# Patient Record
Sex: FEMALE | Race: WHITE | NOT HISPANIC OR LATINO | Employment: FULL TIME | URBAN - METROPOLITAN AREA
[De-identification: names, ages, dates, MRNs, and addresses within clinical notes are randomized per-mention and may not be internally consistent; named-entity substitution may affect disease eponyms.]

---

## 2017-01-13 ENCOUNTER — OFFICE VISIT (OUTPATIENT)
Dept: INTERNAL MEDICINE | Facility: CLINIC | Age: 37
End: 2017-01-13

## 2017-01-13 VITALS
HEART RATE: 98 BPM | DIASTOLIC BLOOD PRESSURE: 64 MMHG | SYSTOLIC BLOOD PRESSURE: 122 MMHG | WEIGHT: 236.13 LBS | TEMPERATURE: 98.5 F | RESPIRATION RATE: 18 BRPM

## 2017-01-13 DIAGNOSIS — M54.50 CHRONIC LOW BACK PAIN WITHOUT SCIATICA, UNSPECIFIED BACK PAIN LATERALITY: ICD-10-CM

## 2017-01-13 DIAGNOSIS — Z00.00 WELL ADULT EXAM: Primary | ICD-10-CM

## 2017-01-13 DIAGNOSIS — Z13.220 LIPID SCREENING: ICD-10-CM

## 2017-01-13 DIAGNOSIS — E66.9 OBESITY, UNSPECIFIED OBESITY SEVERITY, UNSPECIFIED OBESITY TYPE: ICD-10-CM

## 2017-01-13 DIAGNOSIS — G89.29 CHRONIC LOW BACK PAIN WITHOUT SCIATICA, UNSPECIFIED BACK PAIN LATERALITY: ICD-10-CM

## 2017-01-13 DIAGNOSIS — R53.83 FATIGUE, UNSPECIFIED TYPE: ICD-10-CM

## 2017-01-13 LAB
ALBUMIN SERPL-MCNC: 4.3 G/DL (ref 3.2–4.8)
ALBUMIN/GLOB SERPL: 1.4 G/DL (ref 1.5–2.5)
ALP SERPL-CCNC: 78 U/L (ref 25–100)
ALT SERPL W P-5'-P-CCNC: 28 U/L (ref 7–40)
ANION GAP SERPL CALCULATED.3IONS-SCNC: 3 MMOL/L (ref 3–11)
AST SERPL-CCNC: 18 U/L (ref 0–33)
BILIRUB SERPL-MCNC: 0.6 MG/DL (ref 0.3–1.2)
BUN BLD-MCNC: 11 MG/DL (ref 9–23)
BUN/CREAT SERPL: 15.7 (ref 7–25)
CALCIUM SPEC-SCNC: 9.8 MG/DL (ref 8.7–10.4)
CHLORIDE SERPL-SCNC: 103 MMOL/L (ref 99–109)
CHOLEST SERPL-MCNC: 187 MG/DL
CO2 SERPL-SCNC: 31 MMOL/L (ref 20–31)
CREAT BLD-MCNC: 0.7 MG/DL (ref 0.6–1.3)
DEPRECATED RDW RBC AUTO: 43.2 FL (ref 37–54)
ERYTHROCYTE [DISTWIDTH] IN BLOOD BY AUTOMATED COUNT: 12.7 % (ref 11.3–14.5)
EXPIRATION DATE: NORMAL
GFR SERPL CREATININE-BSD FRML MDRD: 95 ML/MIN/1.73
GLOBULIN UR ELPH-MCNC: 3.1 GM/DL
GLUCOSE BLD-MCNC: 94 MG/DL (ref 70–100)
HCT VFR BLD AUTO: 41 % (ref 34.5–44)
HDLC SERPL-MCNC: 62 MG/DL
HGB BLD-MCNC: 13.8 G/DL (ref 11.5–15.5)
LDLC SERPL CALC-MCNC: 110 MG/DL
LDLC/HDLC SERPL: 1.8 {RATIO}
Lab: NORMAL
MCH RBC QN AUTO: 31.2 PG (ref 27–31)
MCHC RBC AUTO-ENTMCNC: 33.7 G/DL (ref 32–36)
MCV RBC AUTO: 92.6 FL (ref 80–99)
PLATELET # BLD AUTO: 290 10*3/MM3 (ref 150–450)
PMV BLD AUTO: 11.4 FL (ref 6–12)
POTASSIUM BLD-SCNC: 4.2 MMOL/L (ref 3.5–5.5)
PROT SERPL-MCNC: 7.4 G/DL (ref 5.7–8.2)
RBC # BLD AUTO: 4.43 10*6/MM3 (ref 3.89–5.14)
SODIUM BLD-SCNC: 137 MMOL/L (ref 132–146)
T4 FREE SERPL-MCNC: 0.96 NG/DL (ref 0.89–1.76)
TRIGL SERPL-MCNC: 74 MG/DL
TSH SERPL DL<=0.05 MIU/L-ACNC: 0.98 MIU/ML (ref 0.35–5.35)
WBC NRBC COR # BLD: 8.58 10*3/MM3 (ref 3.5–10.8)

## 2017-01-13 PROCEDURE — 80061 LIPID PANEL: CPT | Performed by: INTERNAL MEDICINE

## 2017-01-13 PROCEDURE — 85027 COMPLETE CBC AUTOMATED: CPT | Performed by: INTERNAL MEDICINE

## 2017-01-13 PROCEDURE — 84443 ASSAY THYROID STIM HORMONE: CPT | Performed by: INTERNAL MEDICINE

## 2017-01-13 PROCEDURE — 99385 PREV VISIT NEW AGE 18-39: CPT | Performed by: INTERNAL MEDICINE

## 2017-01-13 PROCEDURE — 84439 ASSAY OF FREE THYROXINE: CPT | Performed by: INTERNAL MEDICINE

## 2017-01-13 PROCEDURE — 80053 COMPREHEN METABOLIC PANEL: CPT | Performed by: INTERNAL MEDICINE

## 2017-01-13 RX ORDER — MULTIVITAMIN WITH IRON
TABLET ORAL 4 TIMES DAILY
COMMUNITY

## 2017-01-13 NOTE — PROGRESS NOTES
Subjective   Caterina Baldwin is a 36 y.o. female.     History of Present Illness     Complete physical     1 thyroid disease- Patient is concerned about the possibility of thyroid disease. She has been experiencing hair loss, fullness in the throat, weight gain and difficulty with lossing weight.  Patient says that she has been struggling with this for the past 3-4 months and thinks that it may be hypothyroid.    2 Obesity-chronic.  Patient says that she's been doing with weight for several years and has tried multiple different fad diets and neither of these have worked.      3 Low back pain-chronic patient says that the pain is localized to lower lumbar region and is worse with flexion, extension, rotational movement.  Denies any previous trauma to this area pain has been controlled on over-the-counter such as Motrin or Tylenol.    Nutrition high sugars, fast food, she tends to eat more comfort foods    Exercise: Minimal    screening  Pap smear last year normal- done by Gyn    Menses History  Started 10 years of age  Regular/normal  Flow 5-7 days  +last 1 year her menstral cycle has changed and become               Review of Systems   Constitutional: Positive for fatigue. Negative for chills, diaphoresis, fever and unexpected weight change.   Gastrointestinal: Negative for constipation, diarrhea, nausea, rectal pain and vomiting.   Psychiatric/Behavioral: Negative for decreased concentration, dysphoric mood, hallucinations, self-injury, sleep disturbance and suicidal ideas.       Objective   Physical Exam   HENT:   Head: Normocephalic.   Right Ear: External ear normal.   Left Ear: External ear normal.   Nose: Nose normal.   Mouth/Throat: Oropharynx is clear and moist.   Eyes: Conjunctivae are normal. Pupils are equal, round, and reactive to light.   Neck: Normal range of motion.   Cardiovascular: Normal rate, regular rhythm and normal heart sounds.    Pulmonary/Chest: Effort normal and breath sounds normal.    Abdominal: Soft.   Musculoskeletal: Normal range of motion.   Neurological: She is alert. She has normal reflexes.   Skin: Skin is warm.   Psychiatric: She has a normal mood and affect. Her behavior is normal. Thought content normal.       Assessment/Plan   Caterina was seen today for establish care and annual exam.    Diagnoses and all orders for this visit:    Well adult exam    Fatigue, unspecified type  -     CBC (No Diff)  -     Comprehensive Metabolic Panel  -     T4, Free  -     TSH    Obesity, unspecified obesity severity, unspecified obesity type  -     CBC (No Diff)  -     Comprehensive Metabolic Panel  -     T4, Free  -     TSH    Chronic low back pain without sciatica, unspecified back pain laterality    Lipid screening  -     POC Lipid Panel    Anticipatory guidance:  Discussed the importance of diet modification.  Focus on a variety of different exercises.

## 2017-01-13 NOTE — MR AVS SNAPSHOT
Caterina Baldwin   2017 9:00 AM   Office Visit    Provider:  Joaquín Kay MD   Department:  [facility] MEDICINE AND [facility]ED[facility]ATRICS   Dept Phone:  [phone]                Your Full Care Plan              Your Updated Medication List          This list is accurate as of: 17 10:23 AM.  Always use your most recent med list.                Magnesium 250 MG tablet       MULTI VITAMIN DAILY PO               We Performed the Following     CBC (No Diff)     Comprehensive Metabolic Panel     POC Lipid Panel     T4, Free     TSH       You Were Diagnosed With        Codes Comments    Well adult exam    -  Primary ICD-10-CM: Z00.00  ICD-9-CM: V70.0     Fatigue, unspecified type     ICD-10-CM: R53.83  ICD-9-CM: 780.79     Obesity, unspecified obesity severity, unspecified obesity type     ICD-10-CM: E66.9  ICD-9-CM: 278.00     Chronic low back pain without sciatica, unspecified back pain laterality     ICD-10-CM: M54.5, G89.29  ICD-9-CM: 724.2, 338.29     Lipid screening     ICD-10-CM: Z13.220  ICD-9-CM: V77.91       Instructions     None    Patient Instructions History      [employer] Signup     [facility] [employer] allows you to send messages to your doctor, view your test results, renew your prescriptions, schedule appointments, and more. To sign up, go to [employer] and click on the Sign Up Now link in the New User? box. Enter your [employer] Activation Code exactly as it appears below along with the last four digits of your Social Security Number and your Date of Birth () to complete the sign-up process. If you do not sign up before the expiration date, you must request a new code.    [employer] Activation Code: [email or web address]  Expires: 2017 10:22 AM    If you have questions, you can email [email or web address] or call [phone] to talk to our [employer] staff. Remember, [employer] is NOT to be used for urgent needs. For medical emergencies,  dial 911.               Other Info from Your Visit           Allergies     No Known Allergies      Reason for Visit     Establish Care     Annual Exam           Vital Signs     Blood Pressure Pulse Temperature Respirations Weight Smoking Status    122/64 (BP Location: Right arm, Patient Position: Sitting) 98 98.5 °F (36.9 °C) (Temporal Artery ) 18 236 lb 2 oz (107 kg) Never Smoker      Problems and Diagnoses Noted     Well adult exam    -  Primary    Tiredness        Obesity        Chronic low back pain without sciatica, unspecified back pain laterality        Screening for cholesterol level          Results

## 2017-01-17 ENCOUNTER — TELEPHONE (OUTPATIENT)
Dept: INTERNAL MEDICINE | Facility: CLINIC | Age: 37
End: 2017-01-17

## 2017-01-17 DIAGNOSIS — L65.9 ALOPECIA: Primary | ICD-10-CM

## 2017-01-17 DIAGNOSIS — F32.A DEPRESSION, UNSPECIFIED DEPRESSION TYPE: Primary | ICD-10-CM

## 2017-01-17 NOTE — TELEPHONE ENCOUNTER
----- Message from Chitra Lawler sent at 1/17/2017  9:31 AM EST -----  Contact: Patient  Patient would like results of recent blood work.  She can be reached at [phone].

## 2017-01-20 RX ORDER — DULOXETIN HYDROCHLORIDE 30 MG/1
30 CAPSULE, DELAYED RELEASE ORAL DAILY
Qty: 30 CAPSULE | Refills: 4 | Status: SHIPPED | OUTPATIENT
Start: 2017-01-20 | End: 2017-02-22

## 2017-02-13 ENCOUNTER — TELEPHONE (OUTPATIENT)
Dept: INTERNAL MEDICINE | Facility: CLINIC | Age: 37
End: 2017-02-13

## 2017-02-13 NOTE — TELEPHONE ENCOUNTER
----- Message from Kenya Pillai sent at 2/13/2017  8:31 AM EST -----  Concerning pts dermatology referral, I have been unable to contact pt concerning this referral. She has not responded to phone calls or letters. Is this okay to cancel?

## 2017-02-22 ENCOUNTER — HOSPITAL ENCOUNTER (EMERGENCY)
Facility: HOSPITAL | Age: 37
Discharge: HOME | End: 2017-02-22
Attending: EMERGENCY MEDICINE | Admitting: EMERGENCY MEDICINE

## 2017-02-22 ENCOUNTER — APPOINTMENT (OUTPATIENT)
Dept: GENERAL RADIOLOGY | Facility: HOSPITAL | Age: 37
End: 2017-02-22

## 2017-02-22 ENCOUNTER — OFFICE VISIT (OUTPATIENT)
Dept: INTERNAL MEDICINE | Facility: CLINIC | Age: 37
End: 2017-02-22

## 2017-02-22 VITALS
HEIGHT: 66 IN | TEMPERATURE: 98 F | DIASTOLIC BLOOD PRESSURE: 74 MMHG | SYSTOLIC BLOOD PRESSURE: 109 MMHG | WEIGHT: 230 LBS | OXYGEN SATURATION: 100 % | RESPIRATION RATE: 12 BRPM | BODY MASS INDEX: 36.96 KG/M2 | HEART RATE: 73 BPM

## 2017-02-22 VITALS
TEMPERATURE: 98.4 F | RESPIRATION RATE: 18 BRPM | WEIGHT: 233 LBS | SYSTOLIC BLOOD PRESSURE: 118 MMHG | HEART RATE: 80 BPM | DIASTOLIC BLOOD PRESSURE: 64 MMHG

## 2017-02-22 DIAGNOSIS — R07.9 CHEST PAIN, UNSPECIFIED TYPE: Primary | ICD-10-CM

## 2017-02-22 DIAGNOSIS — R53.83 FATIGUE, UNSPECIFIED TYPE: ICD-10-CM

## 2017-02-22 DIAGNOSIS — R07.89 OTHER CHEST PAIN: Primary | ICD-10-CM

## 2017-02-22 DIAGNOSIS — R10.13 DYSPEPSIA: ICD-10-CM

## 2017-02-22 LAB
ALBUMIN SERPL-MCNC: 4.3 G/DL (ref 3.2–4.8)
ALBUMIN/GLOB SERPL: 1.3 G/DL (ref 1.5–2.5)
ALP SERPL-CCNC: 64 U/L (ref 25–100)
ALT SERPL W P-5'-P-CCNC: 28 U/L (ref 7–40)
ANION GAP SERPL CALCULATED.3IONS-SCNC: 4 MMOL/L (ref 3–11)
AST SERPL-CCNC: 25 U/L (ref 0–33)
B-HCG UR QL: NEGATIVE
BASOPHILS # BLD AUTO: 0.03 10*3/MM3 (ref 0–0.2)
BASOPHILS NFR BLD AUTO: 0.4 % (ref 0–1)
BILIRUB SERPL-MCNC: 0.5 MG/DL (ref 0.3–1.2)
BNP SERPL-MCNC: 5 PG/ML (ref 0–100)
BUN BLD-MCNC: 16 MG/DL (ref 9–23)
BUN/CREAT SERPL: 22.9 (ref 7–25)
CALCIUM SPEC-SCNC: 9.5 MG/DL (ref 8.7–10.4)
CHLORIDE SERPL-SCNC: 106 MMOL/L (ref 99–109)
CO2 SERPL-SCNC: 28 MMOL/L (ref 20–31)
CREAT BLD-MCNC: 0.7 MG/DL (ref 0.6–1.3)
D DIMER PPP FEU-MCNC: 0.27 MG/L (FEU) (ref 0–0.5)
DEPRECATED RDW RBC AUTO: 42.7 FL (ref 37–54)
EOSINOPHIL # BLD AUTO: 0.21 10*3/MM3 (ref 0.1–0.3)
EOSINOPHIL NFR BLD AUTO: 3.1 % (ref 0–3)
ERYTHROCYTE [DISTWIDTH] IN BLOOD BY AUTOMATED COUNT: 12.6 % (ref 11.3–14.5)
GFR SERPL CREATININE-BSD FRML MDRD: 95 ML/MIN/1.73
GLOBULIN UR ELPH-MCNC: 3.2 GM/DL
GLUCOSE BLD-MCNC: 88 MG/DL (ref 70–100)
HCT VFR BLD AUTO: 41.3 % (ref 34.5–44)
HGB BLD-MCNC: 14 G/DL (ref 11.5–15.5)
HOLD SPECIMEN: NORMAL
HOLD SPECIMEN: NORMAL
IMM GRANULOCYTES # BLD: 0.01 10*3/MM3 (ref 0–0.03)
IMM GRANULOCYTES NFR BLD: 0.1 % (ref 0–0.6)
INTERNAL NEGATIVE CONTROL: NEGATIVE
INTERNAL POSITIVE CONTROL: POSITIVE
LIPASE SERPL-CCNC: 30 U/L (ref 6–51)
LYMPHOCYTES # BLD AUTO: 2.24 10*3/MM3 (ref 0.6–4.8)
LYMPHOCYTES NFR BLD AUTO: 32.8 % (ref 24–44)
Lab: NORMAL
MCH RBC QN AUTO: 31.3 PG (ref 27–31)
MCHC RBC AUTO-ENTMCNC: 33.9 G/DL (ref 32–36)
MCV RBC AUTO: 92.2 FL (ref 80–99)
MONOCYTES # BLD AUTO: 0.42 10*3/MM3 (ref 0–1)
MONOCYTES NFR BLD AUTO: 6.2 % (ref 0–12)
NEUTROPHILS # BLD AUTO: 3.91 10*3/MM3 (ref 1.5–8.3)
NEUTROPHILS NFR BLD AUTO: 57.4 % (ref 41–71)
PLATELET # BLD AUTO: 252 10*3/MM3 (ref 150–450)
PMV BLD AUTO: 10.6 FL (ref 6–12)
POTASSIUM BLD-SCNC: 3.9 MMOL/L (ref 3.5–5.5)
PROT SERPL-MCNC: 7.5 G/DL (ref 5.7–8.2)
RBC # BLD AUTO: 4.48 10*6/MM3 (ref 3.89–5.14)
SODIUM BLD-SCNC: 138 MMOL/L (ref 132–146)
TROPONIN I SERPL-MCNC: <0.006 NG/ML
WBC NRBC COR # BLD: 6.82 10*3/MM3 (ref 3.5–10.8)
WHOLE BLOOD HOLD SPECIMEN: NORMAL
WHOLE BLOOD HOLD SPECIMEN: NORMAL

## 2017-02-22 PROCEDURE — 99284 EMERGENCY DEPT VISIT MOD MDM: CPT

## 2017-02-22 PROCEDURE — 83690 ASSAY OF LIPASE: CPT | Performed by: EMERGENCY MEDICINE

## 2017-02-22 PROCEDURE — 85379 FIBRIN DEGRADATION QUANT: CPT | Performed by: EMERGENCY MEDICINE

## 2017-02-22 PROCEDURE — 93000 ELECTROCARDIOGRAM COMPLETE: CPT | Performed by: INTERNAL MEDICINE

## 2017-02-22 PROCEDURE — 99215 OFFICE O/P EST HI 40 MIN: CPT | Performed by: INTERNAL MEDICINE

## 2017-02-22 PROCEDURE — 85025 COMPLETE CBC W/AUTO DIFF WBC: CPT | Performed by: EMERGENCY MEDICINE

## 2017-02-22 PROCEDURE — 36415 COLL VENOUS BLD VENIPUNCTURE: CPT

## 2017-02-22 PROCEDURE — 80053 COMPREHEN METABOLIC PANEL: CPT | Performed by: EMERGENCY MEDICINE

## 2017-02-22 PROCEDURE — 71010 HC CHEST PA OR AP: CPT

## 2017-02-22 PROCEDURE — 83880 ASSAY OF NATRIURETIC PEPTIDE: CPT | Performed by: EMERGENCY MEDICINE

## 2017-02-22 PROCEDURE — 84484 ASSAY OF TROPONIN QUANT: CPT | Performed by: EMERGENCY MEDICINE

## 2017-02-22 PROCEDURE — 93005 ELECTROCARDIOGRAM TRACING: CPT

## 2017-02-22 RX ORDER — HEPARIN SODIUM 1000 [USP'U]/ML
30 INJECTION, SOLUTION INTRAVENOUS; SUBCUTANEOUS AS NEEDED
Status: DISCONTINUED | OUTPATIENT
Start: 2017-02-22 | End: 2017-02-22

## 2017-02-22 RX ORDER — MAGNESIUM HYDROXIDE/ALUMINUM HYDROXICE/SIMETHICONE 120; 1200; 1200 MG/30ML; MG/30ML; MG/30ML
30 SUSPENSION ORAL ONCE
Status: COMPLETED | OUTPATIENT
Start: 2017-02-22 | End: 2017-02-22

## 2017-02-22 RX ORDER — ACETAMINOPHEN 500 MG
500 TABLET ORAL EVERY 6 HOURS PRN
COMMUNITY

## 2017-02-22 RX ORDER — HEPARIN SODIUM 1000 [USP'U]/ML
38.5 INJECTION, SOLUTION INTRAVENOUS; SUBCUTANEOUS ONCE
Status: DISCONTINUED | OUTPATIENT
Start: 2017-02-22 | End: 2017-02-22

## 2017-02-22 RX ORDER — ACETAMINOPHEN 500 MG
1000 TABLET ORAL ONCE
Status: DISCONTINUED | OUTPATIENT
Start: 2017-02-22 | End: 2017-02-22

## 2017-02-22 RX ORDER — SODIUM CHLORIDE 0.9 % (FLUSH) 0.9 %
10 SYRINGE (ML) INJECTION AS NEEDED
Status: DISCONTINUED | OUTPATIENT
Start: 2017-02-22 | End: 2017-02-22 | Stop reason: HOSPADM

## 2017-02-22 RX ORDER — MELOXICAM 7.5 MG/1
7.5 TABLET ORAL DAILY
COMMUNITY

## 2017-02-22 RX ORDER — ASPIRIN 81 MG/1
324 TABLET, CHEWABLE ORAL ONCE
Status: COMPLETED | OUTPATIENT
Start: 2017-02-22 | End: 2017-02-22

## 2017-02-22 RX ORDER — HEPARIN SODIUM 1000 [USP'U]/ML
60 INJECTION, SOLUTION INTRAVENOUS; SUBCUTANEOUS AS NEEDED
Status: DISCONTINUED | OUTPATIENT
Start: 2017-02-22 | End: 2017-02-22

## 2017-02-22 RX ADMIN — ASPIRIN 81 MG 324 MG: 81 TABLET ORAL at 10:51

## 2017-02-22 RX ADMIN — LIDOCAINE HYDROCHLORIDE 15 ML: 20 SOLUTION ORAL; TOPICAL at 11:29

## 2017-02-22 RX ADMIN — ALUMINUM HYDROXIDE, MAGNESIUM HYDROXIDE, AND SIMETHICONE 30 ML: 200; 200; 20 SUSPENSION ORAL at 11:29

## 2017-02-22 NOTE — PROGRESS NOTES
"Subjective   Caterina Baldwin is a 36 y.o. female.     History of Present Illness     Chest discomfort  Occurred 3 days ago  Patient says that she was at home playing and dancing with daughter when she experienced a sudden onset of midsternal chest discomfort that felt like a pressure or \"squeezing sensation\", and says that the pressure sensation has radiated to the left shoulder.  Patient denies any diaphoresis, nausea, vomiting, + fatigue, + mild dizziness, no syncope, no other systemic signs.    On today's exam, patient says that she still has a mild squeezing or pressure-like sensation midsternally but it has improved since 2 days ago.  She says that she did take some aspirin and this did help relieve some of her symptoms.    family history  CAD- grandmother  HTN      Social History: No EtOH consumption, no cigarettes smoking, no IV drug use, no marijuana.    Past medical history:  Arthritis      Review of Systems   Constitutional: Positive for fatigue. Negative for chills, diaphoresis and fever.   Respiratory: Positive for chest tightness. Negative for apnea, cough, shortness of breath and stridor.    Gastrointestinal: Negative for diarrhea, nausea and vomiting.   Neurological: Positive for dizziness and light-headedness. Negative for weakness, numbness and headaches.   Psychiatric/Behavioral: Negative for agitation, confusion, dysphoric mood, sleep disturbance and suicidal ideas.       Objective   Physical Exam   Constitutional: She is oriented to person, place, and time. She appears well-developed and well-nourished.   HENT:   Head: Normocephalic.   Right Ear: External ear normal.   Left Ear: External ear normal.   Nose: Nose normal.   Mouth/Throat: Oropharynx is clear and moist.   Eyes: Conjunctivae and EOM are normal. Pupils are equal, round, and reactive to light.   Neck: Normal range of motion. Neck supple.   Cardiovascular: Normal rate, regular rhythm and normal heart sounds.    Pulmonary/Chest: Effort " normal and breath sounds normal.   Abdominal: Soft. Bowel sounds are normal.   Neurological: She is alert and oriented to person, place, and time. She has normal reflexes.   Skin: Skin is warm.   Psychiatric: She has a normal mood and affect. Her behavior is normal. Thought content normal.   Nursing note and vitals reviewed.      Assessment/Plan   Caterina was seen today for chest pain.    Diagnoses and all orders for this visit:    Chest pain, unspecified type    Fatigue, unspecified type    Other orders  -     ECG 12 Lead          ECG 12 Lead  Date/Time: 2/22/2017 9:19 AM  Performed by: JOVANI SALDANA  Authorized by: JOVANI SALDANA   Comparison: not compared with previous ECG   Rhythm: sinus rhythm  Rate: normal  Conduction: conduction normal  QRS axis: normal  Clinical impression: normal ECG  Comments: Normal EKG no evidence of ischemia or infarction.          After review of patient's 12-lead EKG, physical exam, risk factors and family history, I am recommending that patient be seen immediately at the emergency department at [facility] for further cardiac workup.  I have discussed this with patient and she is agreement with this transfer.

## 2017-02-22 NOTE — ED PROVIDER NOTES
Subjective   HPI Comments: Caterina Baldwin is a 36 y.o.female who presents to the ED with c/o chest pain. 2 nights ago, she was playing with her daughter when she had sudden onset of pain in her substernal chest described as squeezing and pressure radiating to the back. Since then, the pain has become constant, although it has improved with baby ASA. She denies any similar episodes in the past. Today, she saw her PCP who sent her here for further evaluation after present Cho's sign during examination. She denies any F/C, N/V/D, cough, congestion, or any other acute sx at this time.    Patient is a 36 y.o. female presenting with chest pain.   History provided by:  Patient  Chest Pain   Pain location:  Substernal area  Pain radiates to:  Upper back  Pain severity:  Moderate  Onset quality:  Sudden  Duration:  2 days  Timing:  Constant  Progression:  Improving  Chronicity:  New  Context comment:  Dancing with daughter  Relieved by:  Aspirin  Worsened by:  Nothing  Associated symptoms: no abdominal pain, no back pain, no cough, no diaphoresis, no fever, no headache, no nausea, no shortness of breath, no vomiting and no weakness        Review of Systems   Constitutional: Negative for activity change, appetite change, chills, diaphoresis and fever.   HENT: Negative for congestion, postnasal drip, rhinorrhea and sore throat.    Eyes: Negative for visual disturbance.   Respiratory: Negative for apnea, cough, chest tightness and shortness of breath.    Cardiovascular: Positive for chest pain. Negative for leg swelling.   Gastrointestinal: Negative for abdominal pain, blood in stool, diarrhea, nausea and vomiting.   Genitourinary: Negative for difficulty urinating.   Musculoskeletal: Negative for back pain.   Skin: Negative for pallor.   Allergic/Immunologic: Negative for immunocompromised state.   Neurological: Negative for weakness and headaches.   Psychiatric/Behavioral: Negative for behavioral problems.   All other  systems reviewed and are negative.      Past Medical History   Diagnosis Date   • Chronic constipation    • IBS (irritable bowel syndrome)    • Osteoarthritis      back   1/13/17 - lipid panel normal    No Known Allergies    Past Surgical History   Procedure Laterality Date   • Tubal abdominal ligation  2011       Family History   Problem Relation Age of Onset   • Hypertension Mother    • Hypertension Father    • Depression Father    • Hyperlipidemia Father    • Arthritis Maternal Aunt    • Lung cancer Paternal Aunt    • Breast cancer Maternal Grandmother    • Arthritis Maternal Grandmother    • Depression Maternal Grandfather    • Stroke Maternal Grandfather    • Aneurysm Paternal Grandmother        Social History     Social History   • Marital status: Single     Spouse name: N/A   • Number of children: N/A   • Years of education: N/A     Social History Main Topics   • Smoking status: Never Smoker   • Smokeless tobacco: Never Used   • Alcohol use No   • Drug use: No   • Sexual activity: Not Asked     Other Topics Concern   • None     Social History Narrative         Objective   Physical Exam   Constitutional: She is oriented to person, place, and time. She appears well-developed and well-nourished.   HENT:   Head: Normocephalic and atraumatic.   Nose: Nose normal.   Eyes: Conjunctivae and EOM are normal. Pupils are equal, round, and reactive to light.   Neck: Normal range of motion. Neck supple. No JVD present.   Cardiovascular: Normal rate, regular rhythm, normal heart sounds and intact distal pulses.    Pulmonary/Chest: Effort normal and breath sounds normal. No respiratory distress. She exhibits no tenderness.   Abdominal: Soft. Bowel sounds are normal. She exhibits no mass. There is no tenderness. There is no guarding.   Musculoskeletal: Normal range of motion. She exhibits tenderness (Tenderness of the paraspinal muscles bilaterally without reproduction of sx). She exhibits no edema.   ROM of upper extremities  do not reproduce sx.   Lymphadenopathy:     She has no cervical adenopathy.   Neurological: She is alert and oriented to person, place, and time. No cranial nerve deficit. She exhibits normal muscle tone.   No pronator drift.   Skin: Skin is warm and dry. She is not diaphoretic.   Psychiatric: She has a normal mood and affect. Her behavior is normal.   Nursing note and vitals reviewed.      Procedures         ED Course  ED Course     Recent Results (from the past 24 hour(s))   Comprehensive Metabolic Panel    Collection Time: 02/22/17 10:48 AM   Result Value Ref Range    Glucose 88 70 - 100 mg/dL    BUN 16 9 - 23 mg/dL    Creatinine 0.70 0.60 - 1.30 mg/dL    Sodium 138 132 - 146 mmol/L    Potassium 3.9 3.5 - 5.5 mmol/L    Chloride 106 99 - 109 mmol/L    CO2 28.0 20.0 - 31.0 mmol/L    Calcium 9.5 8.7 - 10.4 mg/dL    Total Protein 7.5 5.7 - 8.2 g/dL    Albumin 4.30 3.20 - 4.80 g/dL    ALT (SGPT) 28 7 - 40 U/L    AST (SGOT) 25 0 - 33 U/L    Alkaline Phosphatase 64 25 - 100 U/L    Total Bilirubin 0.5 0.3 - 1.2 mg/dL    eGFR Non African Amer 95 >60 mL/min/1.73    Globulin 3.2 gm/dL    A/G Ratio 1.3 (L) 1.5 - 2.5 g/dL    BUN/Creatinine Ratio 22.9 7.0 - 25.0    Anion Gap 4.0 3.0 - 11.0 mmol/L   Lipase    Collection Time: 02/22/17 10:48 AM   Result Value Ref Range    Lipase 30 6 - 51 U/L   BNP    Collection Time: 02/22/17 10:48 AM   Result Value Ref Range    BNP 5.0 0.0 - 100.0 pg/mL   CBC Auto Differential    Collection Time: 02/22/17 10:48 AM   Result Value Ref Range    WBC 6.82 3.50 - 10.80 10*3/mm3    RBC 4.48 3.89 - 5.14 10*6/mm3    Hemoglobin 14.0 11.5 - 15.5 g/dL    Hematocrit 41.3 34.5 - 44.0 %    MCV 92.2 80.0 - 99.0 fL    MCH 31.3 (H) 27.0 - 31.0 pg    MCHC 33.9 32.0 - 36.0 g/dL    RDW 12.6 11.3 - 14.5 %    RDW-SD 42.7 37.0 - 54.0 fl    MPV 10.6 6.0 - 12.0 fL    Platelets 252 150 - 450 10*3/mm3    Neutrophil % 57.4 41.0 - 71.0 %    Lymphocyte % 32.8 24.0 - 44.0 %    Monocyte % 6.2 0.0 - 12.0 %    Eosinophil % 3.1  (H) 0.0 - 3.0 %    Basophil % 0.4 0.0 - 1.0 %    Immature Grans % 0.1 0.0 - 0.6 %    Neutrophils, Absolute 3.91 1.50 - 8.30 10*3/mm3    Lymphocytes, Absolute 2.24 0.60 - 4.80 10*3/mm3    Monocytes, Absolute 0.42 0.00 - 1.00 10*3/mm3    Eosinophils, Absolute 0.21 0.10 - 0.30 10*3/mm3    Basophils, Absolute 0.03 0.00 - 0.20 10*3/mm3    Immature Grans, Absolute 0.01 0.00 - 0.03 10*3/mm3   D-dimer, Quantitative    Collection Time: 02/22/17 10:48 AM   Result Value Ref Range    D-Dimer, Quantitative 0.27 0.00 - 0.50 mg/L (FEU)   Troponin    Collection Time: 02/22/17 10:48 AM   Result Value Ref Range    Troponin I <0.006 <=0.039 ng/mL   POCT pregnancy, urine    Collection Time: 02/22/17 10:58 AM   Result Value Ref Range    HCG, Urine, QL Negative Negative    Lot Number [id number]     Internal Positive Control Positive     Internal Negative Control Negative      Note: In addition to lab results from this visit, the labs listed above may include labs taken at another facility or during a different encounter within the last 24 hours. Please correlate lab times with ED admission and discharge times for further clarification of the services performed during this visit.    XR Chest 1 View    (Results Pending)     Vitals:    02/22/17 1129 02/22/17 1130 02/22/17 1200 02/22/17 1232   BP:  118/55 109/74    BP Location:       Patient Position:       Pulse:  83 73    Resp: 14   12   Temp:    98 °F (36.7 °C)   TempSrc:       SpO2:  97% 100%    Weight:       Height:         Medications   sodium chloride 0.9 % flush 10 mL (not administered)   aspirin chewable tablet 324 mg (324 mg Oral Given 2/22/17 1051)   aluminum-magnesium hydroxide-simethicone (MAALOX/MYLANTA) suspension 30 mL (30 mL Oral Given 2/22/17 1129)   lidocaine viscous (XYLOCAINE) 2 % mouth solution 15 mL (15 mL Mouth/Throat Given 2/22/17 1129)     ECG/EMG Results (last 24 hours)     Procedure Component Value Units Date/Time    ECG 12 Lead [[id number]] Collected:  02/22/17 0922      Updated:  02/22/17 0954              HEART Score  History: Slightly suspicious (+0)  ECG: Normal (+0)  Age: Less than 45 (+0)  Risk Factors: No risk factors known (+0)  Troponin: Normal limit or lower (+0)  Total: 0             MDM  Number of Diagnoses or Management Options  Dyspepsia:   Other chest pain:   Diagnosis management comments:         I reviewed all available studies with the patient at the bedside.  They are reassuring.  She has no evidence of cardiac ischemia, or pulmonary embolus.    She feels much better after GI cocktail.    I suspect she has some GERD or gastritis from her meloxicam use.  However take Prilosec every day for 2 weeks then as needed which she takes a meloxicam.  I have her follow-up with her primary care doctor in one week for recheck.  She'll return to the ER if worse in any way.  She has very little risk factor for coronary artery disease with a heart score of 0.    All are agreeable with the plan       Amount and/or Complexity of Data Reviewed  Clinical lab tests: reviewed  Tests in the radiology section of CPT®: reviewed  Tests in the medicine section of CPT®: reviewed        Final diagnoses:   Other chest pain   Dyspepsia     EMR Dragon/Transcription disclaimer:   Much of this encounter note is an electronic transcription/translation of spoken language to printed text. The electronic translation of spoken language may permit erroneous, or at times, nonsensical words or phrases to be inadvertently transcribed; Although I have reviewed the note for such errors, some may still exist.     Documentation assistance provided by chong Spring.  Information recorded by the [email or web address] was done at my direction and has been verified and validated by me.     Ayana Spring  02/22/17 2715       Devon Luis MD  02/22/17 0759

## 2018-06-26 ENCOUNTER — OFFICE VISIT (OUTPATIENT)
Dept: INTERNAL MEDICINE | Facility: CLINIC | Age: 38
End: 2018-06-26

## 2018-06-26 VITALS
WEIGHT: 251 LBS | BODY MASS INDEX: 40.34 KG/M2 | HEIGHT: 66 IN | TEMPERATURE: 98.9 F | RESPIRATION RATE: 18 BRPM | SYSTOLIC BLOOD PRESSURE: 118 MMHG | HEART RATE: 76 BPM | DIASTOLIC BLOOD PRESSURE: 84 MMHG

## 2018-06-26 DIAGNOSIS — R82.998 LEUKOCYTES IN URINE: ICD-10-CM

## 2018-06-26 DIAGNOSIS — M25.551 CHRONIC PAIN OF RIGHT HIP: ICD-10-CM

## 2018-06-26 DIAGNOSIS — G89.29 CHRONIC PAIN OF RIGHT HIP: ICD-10-CM

## 2018-06-26 DIAGNOSIS — F41.9 ANXIETY: ICD-10-CM

## 2018-06-26 DIAGNOSIS — Z00.00 HEALTHCARE MAINTENANCE: Primary | ICD-10-CM

## 2018-06-26 LAB
BILIRUB BLD-MCNC: NEGATIVE MG/DL
CLARITY, POC: CLEAR
COLOR UR: YELLOW
EXPIRATION DATE: ABNORMAL
GLUCOSE UR STRIP-MCNC: NEGATIVE MG/DL
KETONES UR QL: NEGATIVE
LEUKOCYTE EST, POC: ABNORMAL
Lab: ABNORMAL
NITRITE UR-MCNC: NEGATIVE MG/ML
PH UR: 6 [PH] (ref 5–8)
PROT UR STRIP-MCNC: NEGATIVE MG/DL
RBC # UR STRIP: ABNORMAL /UL
SP GR UR: 1 (ref 1–1.03)
UROBILINOGEN UR QL: NORMAL

## 2018-06-26 PROCEDURE — 99395 PREV VISIT EST AGE 18-39: CPT | Performed by: INTERNAL MEDICINE

## 2018-06-26 PROCEDURE — 81003 URINALYSIS AUTO W/O SCOPE: CPT | Performed by: INTERNAL MEDICINE

## 2018-06-26 RX ORDER — ESCITALOPRAM OXALATE 10 MG/1
10 TABLET ORAL DAILY
Qty: 30 TABLET | Refills: 3 | Status: SHIPPED | OUTPATIENT
Start: 2018-06-26

## 2018-06-26 RX ORDER — HYDROXYZINE 50 MG/1
50 TABLET, FILM COATED ORAL 3 TIMES DAILY PRN
Qty: 50 TABLET | Refills: 3 | Status: SHIPPED | OUTPATIENT
Start: 2018-06-26

## 2018-06-26 RX ORDER — TRAMADOL HYDROCHLORIDE 50 MG/1
50 TABLET ORAL EVERY 6 HOURS PRN
Qty: 50 TABLET | Refills: 2 | Status: SHIPPED | OUTPATIENT
Start: 2018-06-26

## 2018-06-26 NOTE — PROGRESS NOTES
Subjective   Caterina Baldwin is a 37 y.o. female.     History of Present Illness     Complete physical    1 anxiety- Patient says that she has been having increased anxiety over the past several weeks.  Patient says that she has been having more stress at home and at work.  She denies any suicide ideations, emotional liability of threats towards herself or anybody else.     2 right sciatic pain-patient says that she has been experiencing chronic, recurrent, intermittent episodes of right-sided gluteal pain.  Pain is made worse with flexion, extension, rotational movement around the hip.  This pain has affected her overall quality of life and her ability to perform activities of daily living.    Diet regular    Exercise minimal to none    Social history no EtOH consumption, no IV drugs, no marijuana, no other illicit drugs.    \  Family History   Anxiety       Screening  Pap smear previously done normal.    Review of Systems   Constitutional: Positive for fatigue.   Musculoskeletal:        Diffuse muscle soreness.       Objective   Physical Exam   Constitutional: She is oriented to person, place, and time. She appears well-developed and well-nourished.   HENT:   Head: Normocephalic.   Right Ear: External ear normal.   Left Ear: External ear normal.   Nose: Nose normal.   Mouth/Throat: Oropharynx is clear and moist.   Eyes: Conjunctivae and EOM are normal. Pupils are equal, round, and reactive to light.   Neck: Normal range of motion. Neck supple.   Cardiovascular: Normal rate, regular rhythm, normal heart sounds and intact distal pulses.    Pulmonary/Chest: Effort normal and breath sounds normal.   Abdominal: Soft. Bowel sounds are normal.   Musculoskeletal: Normal range of motion.   Neurological: She is alert and oriented to person, place, and time.   Skin: Skin is warm. Capillary refill takes less than 2 seconds.   Psychiatric: She has a normal mood and affect. Her behavior is normal. Judgment and thought content  normal.   Vitals reviewed.        Assessment/Plan   Caterina was seen today for annual exam.    Diagnoses and all orders for this visit:    Healthcare maintenance  -     POC Urinalysis Dipstick, Automated    Leukocytes in urine  -     Cancel: Urine Culture - Urine, Urine, Clean Catch    Anxiety  -     CBC (No Diff)  -     Comprehensive Metabolic Panel  -     T4, Free  -     TSH  -     Lipid Panel  -     escitalopram (LEXAPRO) 10 MG tablet; Take 1 tablet by mouth Daily.    Side effects and precautions of the new medication(s) have been discussed with patient  I have answered patients questions thoroughly to their understanding.  If patient should experience any side effects from the medication, a follow up appointment should be made.    -     hydrOXYzine (ATARAX) 50 MG tablet; Take 1 tablet by mouth 3 (Three) Times a Day As Needed for Anxiety.    Chronic pain of right hip  -     traMADol (ULTRAM) 50 MG tablet; Take 1 tablet by mouth Every 6 (Six) Hours As Needed for Moderate Pain .    Anticipatory guidance:  Recommend routine dental visit.  Recommend routine ophthalmology visit.  Continue with diet and exercise.

## 2018-07-02 DIAGNOSIS — F33.41 RECURRENT MAJOR DEPRESSIVE DISORDER, IN PARTIAL REMISSION (HCC): Primary | ICD-10-CM

## 2018-07-02 DIAGNOSIS — Z13.220 LIPID SCREENING: ICD-10-CM

## 2018-07-17 ENCOUNTER — TELEPHONE (OUTPATIENT)
Dept: INTERNAL MEDICINE | Facility: CLINIC | Age: 38
End: 2018-07-17

## 2018-07-17 NOTE — TELEPHONE ENCOUNTER
----- Message from Kaylee Naidu sent at 7/17/2018  8:24 AM EDT -----  Contact: SELF  FARAZ ACOSTA CALLING TO SEE IF SHE CAN GET HER LAB RESULTS MAILED TO HER. ADDRESS VERIFIED  IF NEEDED FARAZ CAN BE REACHED [phone]

## 2024-03-15 NOTE — DISCHARGE INSTRUCTIONS
Assessment/Plan:    She does not require a Pap    Declines STD testing    Contraception-we discussed switching her birth control.  I have not seen effect on dentition from Depo-Provera however it can cause temporary decrease in bone density.  We discussed other options.  She was on oral contraceptives in the past but could not remember to consistently take the pill at the same time every day.  She otherwise did well on it.  We discussed the contraceptive patch and the vaginal ring.  We briefly discussed Nexplanon and IUD.  She is interested in the patch.    I reviewed common side effects of the contraceptive patch including nausea, breast tenderness, BTB, HA, bloating, and mood changes.  Risks of blood clot, rare risk of stroke reviewed in detail.  I instructed her on use.she will start this next month as she just had her Depo a month ago.  RTO 3 months for pill check.    Discussed self breast exams    discussed preventive care, regular exercise and a healthy diet      No problem-specific Assessment & Plan notes found for this encounter.       Diagnoses and all orders for this visit:    Encounter for annual routine gynecological examination    Encounter for initial prescription of transdermal patch hormonal contraceptive device  -     norelgestromin-ethinyl estradiol (ORTHO EVRA) 150-35 MCG/24HR; Place 1 patch on the skin over 7 days once a week For 3 weeks, skip patch for 7 days and then repeat    General counseling and advice on female contraception          Subjective:      Patient ID: Radha Hahn is a 19 y.o. female.    Pt here for yearly.  She is on Depo Provera.  She feels that it is affecting her teeth.  She has had some dental issues and attributes it to Depo-Provera.  She is taking calcium and vitamin D.  In the past she was on oral contraceptives and did well on them but could not always remember to take them every day.  She is sexually active.  She is interested in a different form of contraception  Take over-the-counter Prilosec daily for 2 weeks then every time you take meloxicam   that does not require daily administration.    Weight and blood pressure are good.      The following portions of the patient's history were reviewed and updated as appropriate: allergies, current medications, past family history, past medical history, past social history, past surgical history, and problem list.    Review of Systems   Constitutional: Negative.    Gastrointestinal: Negative.    Genitourinary: Negative.          Objective:      There were no vitals taken for this visit.         Physical Exam  Vitals reviewed.   Constitutional:       Appearance: Normal appearance. She is well-developed.   Neck:      Thyroid: No thyromegaly.      Trachea: No tracheal deviation.   Cardiovascular:      Rate and Rhythm: Normal rate and regular rhythm.   Pulmonary:      Effort: Pulmonary effort is normal.      Breath sounds: Normal breath sounds.   Chest:   Breasts:     Breasts are symmetrical.      Right: No inverted nipple, mass, nipple discharge, skin change or tenderness.      Left: No inverted nipple, mass, nipple discharge, skin change or tenderness.   Abdominal:      General: There is no distension.      Palpations: Abdomen is soft. There is no mass.      Tenderness: There is no abdominal tenderness.   Genitourinary:     Labia:         Right: No rash, tenderness, lesion or injury.         Left: No rash, tenderness, lesion or injury.       Vagina: Normal.      Cervix: Normal. No cervical motion tenderness, discharge or friability.      Uterus: Normal.       Adnexa: Right adnexa normal and left adnexa normal.        Right: No mass, tenderness or fullness.          Left: No mass, tenderness or fullness.     Neurological:      Mental Status: She is alert.